# Patient Record
Sex: FEMALE | Race: BLACK OR AFRICAN AMERICAN | URBAN - METROPOLITAN AREA
[De-identification: names, ages, dates, MRNs, and addresses within clinical notes are randomized per-mention and may not be internally consistent; named-entity substitution may affect disease eponyms.]

---

## 2017-03-14 ENCOUNTER — HISTORICAL (OUTPATIENT)
Dept: BARIATRICS | Facility: HOSPITAL | Age: 22
End: 2017-03-14

## 2017-03-22 ENCOUNTER — HISTORICAL (OUTPATIENT)
Dept: BARIATRICS | Facility: HOSPITAL | Age: 22
End: 2017-03-22

## 2017-03-22 ENCOUNTER — HISTORICAL (OUTPATIENT)
Dept: LAB | Facility: HOSPITAL | Age: 22
End: 2017-03-22

## 2017-04-10 ENCOUNTER — HISTORICAL (OUTPATIENT)
Dept: BARIATRICS | Facility: HOSPITAL | Age: 22
End: 2017-04-10

## 2017-04-13 ENCOUNTER — HISTORICAL (OUTPATIENT)
Dept: RADIOLOGY | Facility: HOSPITAL | Age: 22
End: 2017-04-13

## 2017-05-31 ENCOUNTER — HISTORICAL (OUTPATIENT)
Dept: BARIATRICS | Facility: HOSPITAL | Age: 22
End: 2017-05-31

## 2017-07-21 ENCOUNTER — HISTORICAL (OUTPATIENT)
Dept: BARIATRICS | Facility: HOSPITAL | Age: 22
End: 2017-07-21

## 2017-09-27 ENCOUNTER — HISTORICAL (OUTPATIENT)
Dept: BARIATRICS | Facility: HOSPITAL | Age: 22
End: 2017-09-27

## 2017-12-22 ENCOUNTER — HISTORICAL (OUTPATIENT)
Dept: BARIATRICS | Facility: HOSPITAL | Age: 22
End: 2017-12-22

## 2018-03-28 ENCOUNTER — HISTORICAL (OUTPATIENT)
Dept: BARIATRICS | Facility: HOSPITAL | Age: 23
End: 2018-03-28

## 2019-08-17 ENCOUNTER — HOSPITAL ENCOUNTER (OUTPATIENT)
Dept: OBSTETRICS AND GYNECOLOGY | Facility: HOSPITAL | Age: 24
End: 2019-08-17

## 2019-09-26 ENCOUNTER — HOSPITAL ENCOUNTER (OUTPATIENT)
Dept: OBSTETRICS AND GYNECOLOGY | Facility: HOSPITAL | Age: 24
End: 2019-09-26

## 2019-09-26 LAB
ABS NEUT (OLG): 10.96 X10(3)/MCL (ref 2.1–9.2)
ALBUMIN SERPL-MCNC: 2.5 GM/DL (ref 3.4–5)
ALBUMIN/GLOB SERPL: 0.5 {RATIO}
ALP SERPL-CCNC: 105 UNIT/L (ref 38–126)
ALT SERPL-CCNC: 19 UNIT/L (ref 12–78)
APPEARANCE, UA: ABNORMAL
AST SERPL-CCNC: 15 UNIT/L (ref 15–37)
BACTERIA SPEC CULT: ABNORMAL /HPF
BASOPHILS # BLD AUTO: 0 X10(3)/MCL (ref 0–0.2)
BASOPHILS NFR BLD AUTO: 0 %
BILIRUB SERPL-MCNC: 0.8 MG/DL (ref 0.2–1)
BILIRUB UR QL STRIP: ABNORMAL
BILIRUBIN DIRECT+TOT PNL SERPL-MCNC: 0.2 MG/DL (ref 0–0.2)
BILIRUBIN DIRECT+TOT PNL SERPL-MCNC: 0.6 MG/DL (ref 0–0.8)
BUN SERPL-MCNC: 8 MG/DL (ref 7–18)
CALCIUM SERPL-MCNC: 8.8 MG/DL (ref 8.5–10.1)
CHLORIDE SERPL-SCNC: 106 MMOL/L (ref 98–107)
CO2 SERPL-SCNC: 22 MMOL/L (ref 21–32)
COLOR UR: ABNORMAL
CREAT SERPL-MCNC: 0.61 MG/DL (ref 0.55–1.02)
CROSSMATCH INTERPRETATION: NORMAL
EOSINOPHIL # BLD AUTO: 0 X10(3)/MCL (ref 0–0.9)
EOSINOPHIL NFR BLD AUTO: 0 %
ERYTHROCYTE [DISTWIDTH] IN BLOOD BY AUTOMATED COUNT: 20 % (ref 11.5–17)
FOLATE SERPL-MCNC: 5.2 NG/ML (ref 3.1–17.5)
GLOBULIN SER-MCNC: 5 GM/DL (ref 2.4–3.5)
GLUCOSE (UA): NEGATIVE
GLUCOSE SERPL-MCNC: 92 MG/DL (ref 74–106)
GROUP & RH: NORMAL
HCT VFR BLD AUTO: 24.2 % (ref 37–47)
HCT VFR BLD AUTO: 25.7 % (ref 37–47)
HGB BLD-MCNC: 6.7 GM/DL (ref 12–16)
HGB BLD-MCNC: 7.4 GM/DL (ref 12–16)
HGB UR QL STRIP: NEGATIVE
IRON SATN MFR SERPL: 2.9 % (ref 20–50)
IRON SERPL-MCNC: 18 MCG/DL (ref 50–175)
KETONES UR QL STRIP: ABNORMAL
LEUKOCYTE ESTERASE UR QL STRIP: ABNORMAL
LYMPHOCYTES # BLD AUTO: 0.9 X10(3)/MCL (ref 0.6–4.6)
LYMPHOCYTES NFR BLD AUTO: 7 %
MCH RBC QN AUTO: 18.1 PG (ref 27–31)
MCHC RBC AUTO-ENTMCNC: 27.7 GM/DL (ref 33–36)
MCV RBC AUTO: 65.4 FL (ref 80–94)
MONOCYTES # BLD AUTO: 1 X10(3)/MCL (ref 0.1–1.3)
MONOCYTES NFR BLD AUTO: 7 %
NEUTROPHILS # BLD AUTO: 10.96 X10(3)/MCL (ref 2.1–9.2)
NEUTROPHILS NFR BLD AUTO: 85 %
NITRITE UR QL STRIP: NEGATIVE
PH UR STRIP: 6 [PH] (ref 5–9)
PLATELET # BLD AUTO: 340 X10(3)/MCL (ref 130–400)
PMV BLD AUTO: 9.8 FL (ref 9.4–12.4)
POTASSIUM SERPL-SCNC: 3.6 MMOL/L (ref 3.5–5.1)
PRODUCT READY: NORMAL
PROT SERPL-MCNC: 7.5 GM/DL (ref 6.4–8.2)
PROT UR QL STRIP: ABNORMAL
RBC # BLD AUTO: 3.7 X10(6)/MCL (ref 4.2–5.4)
RBC #/AREA URNS HPF: ABNORMAL /[HPF]
SODIUM SERPL-SCNC: 136 MMOL/L (ref 136–145)
SP GR UR STRIP: 1.02 (ref 1–1.03)
SQUAMOUS EPITHELIAL, UA: 11 /HPF (ref 0–4)
TIBC SERPL-MCNC: 628 MCG/DL (ref 250–450)
TRANSFERRIN SERPL-MCNC: 417 MG/DL (ref 200–360)
TRANSFUSION ORDER: NORMAL
UROBILINOGEN UR STRIP-ACNC: 4
VIT B12 SERPL-MCNC: 390 PG/ML (ref 193–986)
WBC # SPEC AUTO: 13 X10(3)/MCL (ref 4.5–11.5)
WBC #/AREA URNS HPF: 125 /HPF (ref 0–3)

## 2019-09-28 LAB — FINAL CULTURE: NORMAL

## 2019-10-04 ENCOUNTER — HISTORICAL (OUTPATIENT)
Dept: ADMINISTRATIVE | Facility: HOSPITAL | Age: 24
End: 2019-10-04

## 2019-10-04 LAB — PRODUCT READY: NORMAL

## 2022-04-30 NOTE — ED PROVIDER NOTES
"   Patient:   Madonna Nichols             MRN: [id number]            FIN: [id number]               Age:   24 years     Sex:  Female     :  1995   Associated Diagnoses:   Symptomatic anemia; Near syncope; Twin pregnancy   Author:   Sandie Funes MD      Basic Information   Time seen: Date & time 2019 11:46:00.   History source: Patient.   Arrival mode: Private vehicle.   History limitation: None.   Additional information: Patient's physician(s): Kimberly Loving MD, Dibbs MD, Santos ESPINOZA, Chief Complaint from Nursing Triage Note : Chief Complaint   2019 11:44 CDT      Chief Complaint           pt to ER via POV for anemia.  pt states she is 27 weeks pregnant with twins and labs 4 days ago showed her H&H 6.8 and 24.  pt states went for glucose test today and passed out, sent to ER for possible transfusion  .      History of Present Illness   The patient presents with 23 y/o female who presents with weakness, she is preg 27 weeks with twins. states had lab work done which showed a low H&H. she also passed out during her glucose test today. ob is dr. loving. neeru, kendrick and I, Sandie Funes MD, assumed care of this patient at 1400.     24-year-old female postural 27 weeks pregnant with plan, followed by Pedro Loving and Juan C presents the emergency Department on recommendation of her primary ObGyn for anemia on labs 4 days ago with a hemoglobin of 6.8, hematocrit 24.  She reports chronic anemia history, last transfusion was about 2 years ago.  She denies any vaginal bleeding or discharge, denies any loss of fluid.  Denies any black or bloody stools.  She reports some mild shortness of breath with exertion and had a syncopal episode today when presenting for a glucose tolerance test.  At this time, she states that she feels "fine".  Reports + fetal movement..  The onset was just prior to arrival.  The course/duration of symptoms is improving.  The character of symptoms is generalized.  The degree at present is " moderate.  Risk factors consist of pregnancy.  Prior episodes: occasional.  Therapy today: doctor's office visit.  Associated symptoms: shortness of breath and dizziness.        Review of Systems   Constitutional symptoms:  Fatigue, No fever,    Respiratory symptoms:  Shortness of breath.   Cardiovascular symptoms:  Syncope, No chest pain,    Gastrointestinal symptoms:  No abdominal pain, no nausea, no vomiting, no diarrhea, no constipation, no rectal bleeding.    Genitourinary symptoms:  No vaginal bleeding, no vaginal discharge.    Neurologic symptoms:  No headache,              Additional review of systems information: All other systems reviewed and otherwise negative.      Health Status   Allergies:    Allergic Reactions (Selected)  No Known Allergies,    Allergies (1) Active Reaction  No Known Allergies None Documented  .      Past Medical/ Family/ Social History   Medical history:    Resolved  Anemia ([id number]):  Resolved..   Surgical history:    Gastric Sleeve Laparoscopic (.) performed by Katelyn MCKEON, Vini RICHARD on 3/28/2017 at 22 Years.  Comments:  3/28/2017 13:08 NOE - Arsalan PAYNE, Dipika BA  auto-populated from documented surgical case  wisdom tooth extraction.  Dilation and curettage (SNOMED CT [id number])., Reviewed as documented in chart.   Family history:    No family history items have been selected or recorded..   Social history:    Social & Psychosocial Habits    Alcohol  03/22/2017  Use: Current    Comment: last drink 1 month ago- stopped as instructed - 03/22/2017 11:50 - [place] PAYNE, Mary Carmen GUTIERREZ    Substance Use  03/22/2017  Use: Never    Tobacco  03/22/2017  Use: Never smoker  , Reviewed as documented in chart.   Problem list:    Active Problems (5)  Anemia   Anxiety   Depression   Morbid obesity   Pregnant   .      Physical Examination               Vital Signs      No qualifying data available.   Oxygen saturation.   General:  Alert, no acute distress.    Skin:  Warm, dry.    Head:  Normocephalic,  atraumatic.    Neck:  Supple, trachea midline.    Eye:  Normal conjunctiva.   Ears, nose, mouth and throat:  Oral mucosa moist.   Cardiovascular:  Regular rate and rhythm, Normal peripheral perfusion, No edema.    Respiratory:  Respirations are non-labored.   Gastrointestinal:  Gravid.   Neurological:  Alert and oriented to person, place, time, and situation, No focal neurological deficit observed.    Psychiatric:  Cooperative.      Medical Decision Making   Documents reviewed:  Emergency department nurses' notes, prior records.    Orders  Launch Order Profile (Selected)   Inpatient Orders  Ordered  Transfusion Order RBC's: 09/26/19 14:38:00 CDT, Now collect, Blood, Lab Collect, Packed RBC, To Give, 2, 09/26/19, Print Label By Order Location, 09/26/19 14:38:00 CDT  Type and Crossmatch 1st order: 09/26/19 11:47:00 CDT, Routine collect, Blood, Lab Collect, Packed RBC, To Give, 2, 09/26/19, Print Label By Order Location, 09/26/19 11:47:00 CDT  Ordered (In-Lab)  Urine Culture [id number]: Stat collect, 09/26/19 11:59:00 CDT, Urine, Collected, Nurse collect, [id number], Stop date 09/26/19 11:59:00 CDT, Print Label By Order Location  Completed  Automated Diff: Now collect, 09/26/19 12:16:00 CDT, Blood, Collected, Stop date 09/26/19 12:16:00 CDT, Lab Collect, Print Label By Order Location, 09/26/19 11:47:00 CDT  CBC w/ Auto Diff: Now collect, 09/26/19 11:47:00 CDT, Blood, Stop date 09/26/19 11:48:00 CDT, Lab Collect, Print Label By Order Location, 09/26/19 11:47:00 CDT  CMP: Stat collect, 09/26/19 11:47:00 CDT, Blood, Stop date 09/26/19 11:48:00 CDT, Lab Collect, Print Label By Order Location, 09/26/19 11:47:00 CDT  Estimated Glomerular Filtration Rate: Stat collect, 09/26/19 12:16:00 CDT, Blood, Collected, Stop date 09/26/19 12:16:00 CDT, Lab Collect, Print Label By Order Location, 09/26/19 11:47:00 CDT  UA Total a reflex to culture: Stat collect, Urine, 09/26/19 11:47:00 CDT, Stop date 09/26/19 11:48:00 CDT, Nurse  collect, Print Label By Order Location.   Results review:  Lab results : Lab View   9/26/2019 12:16 CDT      Sodium Lvl                136 mmol/L                             Potassium Lvl             3.6 mmol/L                             Chloride                  106 mmol/L                             CO2                       22.0 mmol/L                             Calcium Lvl               8.8 mg/dL                             Glucose Lvl               92 mg/dL                             BUN                       8.0 mg/dL                             Creatinine                0.61 mg/dL                             eGFR-AA                   >60 mL/min/1.73 m2  NA                             eGFR-MARIYA                  >60 mL/min/1.73 m2  NA                             Bili Total                0.8 mg/dL                             Bili Direct               0.20 mg/dL                             Bili Indirect             0.60 mg/dL                             AST                       15 unit/L                             ALT                       19 unit/L                             Alk Phos                  105 unit/L                             Total Protein             7.5 gm/dL                             Albumin Lvl               2.50 gm/dL  LOW                             Globulin                  5.00 gm/dL  HI                             A/G Ratio                 0.5  NA                             WBC                       13.0 x10(3)/mcL  HI                             RBC                       3.70 x10(6)/mcL  LOW                             Hgb                       6.7 gm/dL  LOW                             Hct                       24.2 %  LOW                             Platelet                  340 x10(3)/mcL                             MCV                       65.4 fL  LOW                             MCH                       18.1 pg  LOW                             MCHC                      27.7  gm/dL  LOW                             RDW                       20.0 %  HI                             MPV                       9.8 fL                             Abs Neut                  10.96 x10(3)/mcL  HI                             Neutro Auto               85 %  NA                             Lymph Auto                7 %  NA                             Mono Auto                 7 %  NA                             Eos Auto                  0 %  NA                             Abs Eos                   0.0 x10(3)/mcL                             Basophil Auto             0 %  NA                             Abs Neutro                10.96 x10(3)/mcL  HI                             Abs Lymph                 0.9 x10(3)/mcL                             Abs Mono                  1.0 x10(3)/mcL                             Abs Baso                  0.0 x10(3)/mcL    9/26/2019 11:59 CDT      UA Appear                 CLOUDY                             UA Color                  DK YELLOW                             UA Spec Grav              1.023                             UA Bili                   1+                             UA pH                     6.0                             UA Urobilinogen           4.0                             UA Blood                  Negative                             UA Glucose                Negative                             UA Ketones                Trace                             UA Protein                1+                             UA Nitrite                Negative                             UA Leuk Est               3+                             UA WBC                    125 /HPF  HI                             UA RBC                    NONE SEEN                             UA Bacteria               1+ /HPF                             UA Squam Epithelial       11 /HPF  HI  ,    No qualifying data available, Interpretation Abnormal results  contaminated UA,  microcytic anemia.       Reexamination/ Reevaluation   Vital signs   results included from flowsheet : Vital Signs   9/26/2019 14:05 CDT      Peripheral Pulse Rate     105 bpm  HI                             Peripheral Pulse Rate     107 bpm  HI                             Respiratory Rate          17 br/min                             Respiratory Rate          17 br/min                             SpO2                      100 %                             SpO2                      100 %                             Oxygen Therapy            Room air                             Oxygen Therapy            Room air                             Systolic Blood Pressure   116 mmHg                             Systolic Blood Pressure   116 mmHg                             Diastolic Blood Pressure  54 mmHg  LOW                             Diastolic Blood Pressure  54 mmHg  LOW                             Mean Arterial Pressure, Cuff              75 mmHg                             Mean Arterial Pressure, Cuff              75 mmHg     Notes: Patient's tachycardia improved with rest, otherwise vital signs unremarkable.  Labs with significant microcytic anemia though not significant change from 4 days prior.  She denies any overt bleeding at home.  History of chronic anemia last requiring transfusion a couple of years ago.  Discussed with her ObGyn, will admit for fetal monitoring while receiving blood transfusion. Findings and plan discussed with the patient, and she is agreeable to admission at this time.   .      Impression and Plan   Diagnosis   Symptomatic anemia (LXW22-UH D64.9)   Near syncope (SCG80-SO R55)   Twin pregnancy (JWM78-BC O30.009)   Plan   Condition: Stable.    Disposition: Admit time  9/26/2019 15:16:00, Place in Observation Unit, Kimberly Dempsey MD.    Counseled: Patient, Regarding diagnosis, Regarding diagnostic results, Regarding treatment plan, Patient indicated understanding of instructions.